# Patient Record
Sex: FEMALE
[De-identification: names, ages, dates, MRNs, and addresses within clinical notes are randomized per-mention and may not be internally consistent; named-entity substitution may affect disease eponyms.]

---

## 2022-12-27 PROBLEM — Z00.00 ENCOUNTER FOR PREVENTIVE HEALTH EXAMINATION: Status: ACTIVE | Noted: 2022-12-27

## 2022-12-28 ENCOUNTER — APPOINTMENT (OUTPATIENT)
Dept: OTOLARYNGOLOGY | Facility: CLINIC | Age: 57
End: 2022-12-28

## 2022-12-28 VITALS — BODY MASS INDEX: 21.66 KG/M2 | TEMPERATURE: 96.3 F | HEIGHT: 67 IN | WEIGHT: 138 LBS

## 2022-12-28 DIAGNOSIS — H93.293 OTHER ABNORMAL AUDITORY PERCEPTIONS, BILATERAL: ICD-10-CM

## 2022-12-28 DIAGNOSIS — Z80.9 FAMILY HISTORY OF MALIGNANT NEOPLASM, UNSPECIFIED: ICD-10-CM

## 2022-12-28 DIAGNOSIS — Z78.9 OTHER SPECIFIED HEALTH STATUS: ICD-10-CM

## 2022-12-28 DIAGNOSIS — H61.23 IMPACTED CERUMEN, BILATERAL: ICD-10-CM

## 2022-12-28 PROCEDURE — 92567 TYMPANOMETRY: CPT

## 2022-12-28 PROCEDURE — 92557 COMPREHENSIVE HEARING TEST: CPT

## 2022-12-28 PROCEDURE — 31231 NASAL ENDOSCOPY DX: CPT

## 2022-12-28 PROCEDURE — 99203 OFFICE O/P NEW LOW 30 MIN: CPT | Mod: 25

## 2022-12-28 RX ORDER — ESTRADIOL 0.5 MG/1
TABLET ORAL
Refills: 0 | Status: ACTIVE | COMMUNITY

## 2022-12-28 NOTE — HISTORY OF PRESENT ILLNESS
[de-identified] : BOAZ CHINCHILLA is a 57 year old patient referred by Dr. Morris for sinus evaluation.  She has right maxillary implants.  They replaced about 3 years ago.  She was seen here in the past.  She said that one of them has become infected and needs to be removed.  On dental CT scan there were sinus findings.  She developed an upper respiratory tract infection on December 21.  She has had throat pain, nasal congestion, and difficulty eating.  She also noticed some mild tinnitus on the right side since she became sick.  She describes it as a pulsation.  she has not had testing for COVID, the flu, or RSV.  She was on amoxicillin on December 15 for her dental infection.

## 2022-12-28 NOTE — ASSESSMENT
[FreeTextEntry1] : She has possible chronic right maxillary sinusitis.  On dental CT scan, there was opacification of the inferior portion of the sinus.  On nasal endoscopy there is no purulent drainage or congestion from the middle meatus.  She does have a dental implant which needs to be removed.\par \par She also has an acute upper respiratory tract infection with pharyngitis.  She has physical/ulcerations on the posterior tonsillar pillar on the right side on flexible laryngoscopy.  She may also have a tiny cyst on the posterior glottic area.  A culture was sent to rule out bacterial infection.  However, this is most likely viral\par \par She has also had mild right-sided tinnitus.  She had cerumen impaction bilaterally which was removed.  Audiogram was normal.\par \par Plan\par -Findings and management options were discussed with the patient.\par -Hydration\par -Avoid alcohol based mouthwashes.  She may use a salt water gargle as needed\par -I recommend a bland diet\par -I am giving her Magic mouthwash to help with the discomfort\par -I have asked her to call for the culture results.  If it is positive, I will place her on antibiotics\par -I asked her to consider seeing a PCP or going to urgent care to get testing for COVID, the flu, and RSV.\par -I am going to speak with Dr. Morris about her sinuses.  She may need a CT scan.\par -Good ear hygiene\par -Annual audiogram as needed\par -I will speak with her after I speak with Dr. Morris.

## 2022-12-28 NOTE — CONSULT LETTER
[Dear  ___] : Dear  [unfilled], [Consult Letter:] : I had the pleasure of evaluating your patient, [unfilled]. [Please see my note below.] : Please see my note below. [Consult Closing:] : Thank you very much for allowing me to participate in the care of this patient.  If you have any questions, please do not hesitate to contact me. [Sincerely,] : Sincerely, [FreeTextEntry3] : Keely Duckworth MD\par

## 2022-12-30 ENCOUNTER — APPOINTMENT (OUTPATIENT)
Dept: OTOLARYNGOLOGY | Facility: CLINIC | Age: 57
End: 2022-12-30

## 2023-01-03 LAB — BACTERIA THROAT CULT: NORMAL

## 2023-01-06 ENCOUNTER — NON-APPOINTMENT (OUTPATIENT)
Age: 58
End: 2023-01-06

## 2023-01-13 ENCOUNTER — APPOINTMENT (OUTPATIENT)
Dept: CT IMAGING | Facility: CLINIC | Age: 58
End: 2023-01-13
Payer: COMMERCIAL

## 2023-01-13 PROCEDURE — 70486 CT MAXILLOFACIAL W/O DYE: CPT

## 2023-01-25 ENCOUNTER — APPOINTMENT (OUTPATIENT)
Dept: OTOLARYNGOLOGY | Facility: CLINIC | Age: 58
End: 2023-01-25
Payer: COMMERCIAL

## 2023-01-25 VITALS — TEMPERATURE: 97.1 F | BODY MASS INDEX: 21.66 KG/M2 | WEIGHT: 138 LBS | HEIGHT: 67 IN

## 2023-01-25 DIAGNOSIS — J31.0 CHRONIC RHINITIS: ICD-10-CM

## 2023-01-25 DIAGNOSIS — J32.0 CHRONIC MAXILLARY SINUSITIS: ICD-10-CM

## 2023-01-25 DIAGNOSIS — Z87.09 PERSONAL HISTORY OF OTHER DISEASES OF THE RESPIRATORY SYSTEM: ICD-10-CM

## 2023-01-25 PROCEDURE — 99213 OFFICE O/P EST LOW 20 MIN: CPT

## 2023-01-25 RX ORDER — DIPHENHYDRAMINE HYDROCHLORIDE AND LIDOCAINE HYDROCHLORIDE AND ALUMINUM HYDROXIDE AND MAGNESIUM HYDRO
KIT 4 TIMES DAILY
Qty: 60 | Refills: 0 | Status: COMPLETED | COMMUNITY
Start: 2022-12-28 | End: 2023-01-25

## 2023-01-25 RX ORDER — IPRATROPIUM BROMIDE 21 UG/1
0.03 SPRAY NASAL
Qty: 1 | Refills: 3 | Status: ACTIVE | COMMUNITY
Start: 2023-01-25 | End: 1900-01-01

## 2023-01-25 NOTE — ASSESSMENT
[FreeTextEntry1] : She has been doing well.  She has no symptoms at this time.  We reviewed her CT scan.  She did have mucosal thickening of the right inferior maxillary sinus.  The OMCs were open.  She does not have a lot of sinus symptoms.  Does have symptoms consistent with chronic vasomotor rhinitis.\par \par Plan\par -Findings and management options were discussed with the patient.\par -She may use allergy and sinus medications as needed.\par -I gave her Atrovent nasal spray to try.  If it helps, she could consider vidian nerve cryotherapy\par -Since she is not having a lot of sinus symptoms, we will observe the sinuses for now unless there is concern for something more concerning.  She is seeing Dr. Morris tomorrow.  I have asked her to have him call me during the visit if possible.\par -I will see her back if she has any concerns or worsening symptoms.

## 2023-01-25 NOTE — HISTORY OF PRESENT ILLNESS
[de-identified] : BOAZ CHINCHILLA is a 57 year old patient here to review her CT scan.  She is feeling a lot better since her last visit.  At that time she had acute pharyngitis.  That has since resolved.  She has no sinus pain or pressure.  She does have symptoms consistent with vasomotor rhinitis.  She has a runny nose during the wintertime..  Her CT scan showed mucosal thickening involving the inferior portion of the right maxillary sinus.  The OMCs were patent.  There is no significant inflammation otherwise.  She has an appointment to see Dr. Morris tomorrow

## 2023-01-25 NOTE — CONSULT LETTER
[Dear  ___] : Dear  [unfilled], [Courtesy Letter:] : I had the pleasure of seeing your patient, [unfilled], in my office today. [Please see my note below.] : Please see my note below. [Consult Closing:] : Thank you very much for allowing me to participate in the care of this patient.  If you have any questions, please do not hesitate to contact me. [Sincerely,] : Sincerely, [FreeTextEntry3] : Keely Duckworth MD\par